# Patient Record
Sex: FEMALE | Race: OTHER | Employment: FULL TIME | URBAN - METROPOLITAN AREA
[De-identification: names, ages, dates, MRNs, and addresses within clinical notes are randomized per-mention and may not be internally consistent; named-entity substitution may affect disease eponyms.]

---

## 2022-04-26 ENCOUNTER — TELEPHONE (OUTPATIENT)
Dept: PRIMARY CARE CLINIC | Age: 47
End: 2022-04-26

## 2022-04-26 NOTE — TELEPHONE ENCOUNTER
----- Message from Bin Dolores sent at 2022 12:21 PM EDT -----  Subject: Appointment Request    Reason for Call: New Patient Request Appointment    QUESTIONS  Type of Appointment? New Patient/New to Provider  Reason for appointment request? No appointments available during search  Additional Information for Provider? please call needs a new PCP and would   like this location   ---------------------------------------------------------------------------  --------------  CALL BACK INFO  What is the best way for the office to contact you? OK to leave message on   voicemail  Preferred Call Back Phone Number? [phone]  ---------------------------------------------------------------------------  --------------  SCRIPT ANSWERS  Relationship to Patient? Self  Specialty Confirmation? Primary Care  Is this the first appointment to establish care for a ? No  Have you been diagnosed with, awaiting test results for, or told that you   are suspected of having COVID-19 (Coronavirus)? (If patient has tested   negative or was tested as a requirement for work, school, or travel and   not based on symptoms, answer no)? No  Within the past 10 days have you developed any of the following symptoms   (answer no if symptoms have been present longer than 10 days or began   more than 10 days ago)? Fever or Chills, Cough, Shortness of breath or   difficulty breathing, Loss of taste or smell, Sore throat, Nasal   congestion, Sneezing or runny nose, Fatigue or generalized body aches   (answer no if pain is specific to a body part e.g. back pain), Diarrhea,   Headache? No  Have you had close contact with someone with COVID-19 in the last 7 days? No  (Service Expert  click yes below to proceed with [employer] As Usual   Scheduling)? Yes  Have you been diagnosed with, awaiting test results for, or told that you   are suspected of having COVID-19 (Coronavirus)?  (If patient has tested   negative or was tested as a requirement for work, school, or travel and   not based on symptoms, answer no)? No  Within the past 10 days have you developed any of the following symptoms   (answer no if symptoms have been present longer than 10 days or began   more than 10 days ago)? Fever or Chills, Cough, Shortness of breath or   difficulty breathing, Loss of taste or smell, Sore throat, Nasal   congestion, Sneezing or runny nose, Fatigue or generalized body aches   (answer no if pain is specific to a body part e.g. back pain), Diarrhea,   Headache? No  Have you had close contact with someone with COVID-19 in the last 7 days? No  (Service Expert  click yes below to proceed with [employer] As Usual   Scheduling)?  Yes

## 2022-10-12 ENCOUNTER — OFFICE VISIT (OUTPATIENT)
Dept: PRIMARY CARE CLINIC | Age: 47
End: 2022-10-12
Payer: COMMERCIAL

## 2022-10-12 VITALS
BODY MASS INDEX: 23.39 KG/M2 | SYSTOLIC BLOOD PRESSURE: 132 MMHG | HEART RATE: 71 BPM | HEIGHT: 70 IN | OXYGEN SATURATION: 98 % | DIASTOLIC BLOOD PRESSURE: 82 MMHG | WEIGHT: 163.4 LBS

## 2022-10-12 DIAGNOSIS — I73.00 RAYNAUD'S DISEASE WITHOUT GANGRENE: ICD-10-CM

## 2022-10-12 DIAGNOSIS — F90.9 ATTENTION DEFICIT HYPERACTIVITY DISORDER (ADHD), UNSPECIFIED ADHD TYPE: ICD-10-CM

## 2022-10-12 DIAGNOSIS — Z12.11 SCREENING FOR COLON CANCER: ICD-10-CM

## 2022-10-12 DIAGNOSIS — Z13.1 SCREENING FOR DIABETES MELLITUS: ICD-10-CM

## 2022-10-12 DIAGNOSIS — Z13.220 SCREENING FOR LIPID DISORDERS: ICD-10-CM

## 2022-10-12 DIAGNOSIS — F41.9 ANXIETY: ICD-10-CM

## 2022-10-12 DIAGNOSIS — Z00.00 WELL ADULT EXAM: Primary | ICD-10-CM

## 2022-10-12 DIAGNOSIS — N95.1 PERIMENOPAUSAL: ICD-10-CM

## 2022-10-12 PROCEDURE — 99386 PREV VISIT NEW AGE 40-64: CPT | Performed by: FAMILY MEDICINE

## 2022-10-12 RX ORDER — BUPROPION HYDROCHLORIDE 300 MG/1
TABLET ORAL
COMMUNITY
Start: 2022-09-24

## 2022-10-12 RX ORDER — DEXMETHYLPHENIDATE HYDROCHLORIDE 10 MG/1
TABLET ORAL
COMMUNITY
Start: 2022-09-28

## 2022-10-12 RX ORDER — DEXMETHYLPHENIDATE HYDROCHLORIDE 40 MG/1
CAPSULE, EXTENDED RELEASE ORAL
COMMUNITY
Start: 2022-09-26

## 2022-10-12 SDOH — ECONOMIC STABILITY: FOOD INSECURITY: WITHIN THE PAST 12 MONTHS, THE FOOD YOU BOUGHT JUST DIDN'T LAST AND YOU DIDN'T HAVE MONEY TO GET MORE.: NEVER TRUE

## 2022-10-12 SDOH — ECONOMIC STABILITY: FOOD INSECURITY: WITHIN THE PAST 12 MONTHS, YOU WORRIED THAT YOUR FOOD WOULD RUN OUT BEFORE YOU GOT MONEY TO BUY MORE.: NEVER TRUE

## 2022-10-12 ASSESSMENT — ENCOUNTER SYMPTOMS
ABDOMINAL PAIN: 0
CHEST TIGHTNESS: 0
BACK PAIN: 0
CONSTIPATION: 0
SHORTNESS OF BREATH: 0
DIARRHEA: 0
COUGH: 0
SORE THROAT: 0

## 2022-10-12 ASSESSMENT — PATIENT HEALTH QUESTIONNAIRE - PHQ9
SUM OF ALL RESPONSES TO PHQ9 QUESTIONS 1 & 2: 0
SUM OF ALL RESPONSES TO PHQ QUESTIONS 1-9: 0
1. LITTLE INTEREST OR PLEASURE IN DOING THINGS: 0
2. FEELING DOWN, DEPRESSED OR HOPELESS: 0
SUM OF ALL RESPONSES TO PHQ QUESTIONS 1-9: 0

## 2022-10-12 ASSESSMENT — SOCIAL DETERMINANTS OF HEALTH (SDOH): HOW HARD IS IT FOR YOU TO PAY FOR THE VERY BASICS LIKE FOOD, HOUSING, MEDICAL CARE, AND HEATING?: NOT HARD AT ALL

## 2022-10-12 NOTE — PROGRESS NOTES
Subjective:     Patient ID: Mitesh Umana is a 56 y.o. female    HPI  Very nice lady is new to the practice is transferring because of insurance reasons. Her past medical history is includes treatment for anxiety and ADHD. She is currently on bupropion as well as dexmethylphenidate under the care of Dr. Lonnie Mancera. She also has a history of Raynaud's phenomenon. ? Menopause? Lmp June 20  was having hotflashes and night sweats   Exercises 3 days weekly  Nutrition good   stable weight  Works as a special . She does drink alcohol but does not smoke  Has 3 children and a . Review of Systems   Constitutional:  Negative for activity change, appetite change, fatigue and fever. HENT:  Negative for sore throat. Eyes:  Negative for visual disturbance. Respiratory:  Negative for cough, chest tightness and shortness of breath. Cardiovascular:  Negative for chest pain, palpitations and leg swelling. Gastrointestinal:  Negative for abdominal pain, constipation and diarrhea. Endocrine: Negative for cold intolerance. Genitourinary:  Negative for dysuria and urgency. Musculoskeletal:  Negative for back pain. Neurological:  Negative for dizziness, syncope and headaches. Hematological:  Does not bruise/bleed easily. Psychiatric/Behavioral:  Positive for decreased concentration. Negative for confusion and sleep disturbance. The patient is nervous/anxious. Objective:     Physical Exam  Vitals and nursing note reviewed. Constitutional:       General: She is not in acute distress. Appearance: Normal appearance. HENT:      Head: Normocephalic. Right Ear: External ear normal.      Left Ear: External ear normal.      Nose: Nose normal.      Mouth/Throat:      Mouth: Mucous membranes are moist.      Pharynx: Oropharynx is clear. Eyes:      Conjunctiva/sclera: Conjunctivae normal.      Pupils: Pupils are equal, round, and reactive to light.    Cardiovascular:      Rate and Rhythm: Normal rate and regular rhythm. Pulses: Normal pulses. Heart sounds: Normal heart sounds. No murmur heard. Pulmonary:      Effort: Pulmonary effort is normal.      Breath sounds: Normal breath sounds. No wheezing. Musculoskeletal:         General: Normal range of motion. Cervical back: Neck supple. Right lower leg: No edema. Left lower leg: No edema. Lymphadenopathy:      Cervical: No cervical adenopathy. Skin:     General: Skin is warm and dry. Capillary Refill: Capillary refill takes less than 2 seconds. Neurological:      General: No focal deficit present. Mental Status: She is alert and oriented to person, place, and time. Psychiatric:         Mood and Affect: Mood normal.         Behavior: Behavior normal.       Assessment/Plan:     1. Well adult exam    2. Perimenopausal    3. Attention deficit hyperactivity disorder (ADHD), unspecified ADHD type    4. Anxiety    5. Raynaud's disease without gangrene    6. Screening for colon cancer    7. Screening for lipid disorders    8. Screening for diabetes mellitus          Anson Barone was seen today for new patient and other. Diagnoses and all orders for this visit:    Well adult exam  We discussed the anti-inflammatory diet and low glycemic index. We also discussed carbohydrate to fiber ratio no higher than 10-1. We discussed no naked carbohydrates. Discussed the headspace meditation jigna. Discussed 8 fit jigna for HIIT. Discussed 150 minutes of moderate exercise weekly  Reinforced need for quality sleep  Specific for the insomnia add magnesium 400 mg 2 hours before bedtime, OLLI an  hour before bedtime, DHA fish oil 1 g in the morning. Perimenopausal  Most certainly she is postmenopausal and has hot flashes associated with that. We discussed checking FSH and LH which she has neither. 2 years. Discussed adding DHA fish oil and magnesium as well as anti-inflammatory lifestyle.   Handouts given to her with brief explanation. Also discussed some supplements that may help her with her symptoms  Attention deficit hyperactivity disorder (ADHD), unspecified ADHD type  Treated with Focalin    Anxiety  Treated with Wellbutrin    Raynaud's disease without gangrene  -     Sedimentation Rate; Future    Screening for colon cancer  -     CBC with Auto Differential; Future  -     Basic Metabolic Panel; Future  -     Lipid Panel; Future  -     Urinalysis; Future  -     Cologuard (Fecal DNA Colorectal Cancer Screening)    Screening for lipid disorders  -     CBC with Auto Differential; Future  -     Basic Metabolic Panel; Future  -     Lipid Panel; Future  -     Urinalysis; Future  -     Cologuard (Fecal DNA Colorectal Cancer Screening)    Screening for diabetes mellitus  -     CBC with Auto Differential; Future  -     Basic Metabolic Panel; Future  -     Lipid Panel; Future  -     Urinalysis; Future  -     Cologuard (Fecal DNA Colorectal Cancer Screening)  We will call her with results of her blood work and follow-up as needed      Venu Diaz MD    Please note that this chart was generated using voice recognition Dragon dictation software. Although every effort was made to ensure the accuracy of this automated transcription, some errors in transcription may have occurred.

## 2022-10-12 NOTE — PATIENT INSTRUCTIONS
Recommend [employer] fish oil or NOW brand of 1000 mg of DHA daily  Recommend magnesium 400 to 600 mg 2 hours before bedtime.   If GI upset with magnesium oxide may use magnesium malate or magnesium threonate

## 2023-03-17 LAB
BASOPHILS ABSOLUTE: 0.1 /ΜL
BASOPHILS RELATIVE PERCENT: 1 %
BILIRUBIN, URINE: NEGATIVE
BLOOD, URINE: NEGATIVE
BUN BLDV-MCNC: 12 MG/DL
CALCIUM SERPL-MCNC: 9.5 MG/DL
CHLORIDE BLD-SCNC: 105 MMOL/L
CHOLESTEROL, TOTAL: 194 MG/DL
CHOLESTEROL/HDL RATIO: 2.3
CLARITY: CLEAR
CO2: 28 MMOL/L
COLOR: YELLOW
CREAT SERPL-MCNC: 0.88 MG/DL
EGFR: 82
EOSINOPHILS ABSOLUTE: 0.1 /ΜL
EOSINOPHILS RELATIVE PERCENT: 1.8 %
GLUCOSE BLD-MCNC: 78 MG/DL
GLUCOSE URINE: NEGATIVE
HCT VFR BLD CALC: 44.5 % (ref 36–46)
HDLC SERPL-MCNC: 83 MG/DL (ref 35–70)
HEMOGLOBIN: 14.9 G/DL (ref 12–16)
KETONES, URINE: NEGATIVE
LDL CHOLESTEROL CALCULATED: 95 MG/DL (ref 0–160)
LEUKOCYTE ESTERASE, URINE: NEGATIVE
LYMPHOCYTES ABSOLUTE: 1.8 /ΜL
LYMPHOCYTES RELATIVE PERCENT: 26.1 %
MCH RBC QN AUTO: 32 PG
MCHC RBC AUTO-ENTMCNC: 33.4 G/DL
MCV RBC AUTO: 96 FL
MONOCYTES ABSOLUTE: 0.4 /ΜL
MONOCYTES RELATIVE PERCENT: 5.9 %
NEUTROPHILS ABSOLUTE: 4.6 /ΜL
NEUTROPHILS RELATIVE PERCENT: 65.2 %
NITRITE, URINE: NEGATIVE
NONHDLC SERPL-MCNC: ABNORMAL MG/DL
PDW BLD-RTO: 12.7 %
PH UA: 7 (ref 4.5–8)
PLATELET # BLD: 247 K/ΜL
PMV BLD AUTO: 9 FL
POTASSIUM SERPL-SCNC: 4.4 MMOL/L
PROTEIN UA: POSITIVE
RBC # BLD: 4.65 10^6/ΜL
SEDIMENTATION RATE, ERYTHROCYTE: 3
SODIUM BLD-SCNC: 141 MMOL/L
SPECIFIC GRAVITY, URINE: 1.02
TRIGL SERPL-MCNC: 79 MG/DL
UROBILINOGEN, URINE: NORMAL
VLDLC SERPL CALC-MCNC: ABNORMAL MG/DL
WBC # BLD: 7 10^3/ML

## 2023-03-20 DIAGNOSIS — N95.1 PERIMENOPAUSAL: ICD-10-CM

## 2023-03-20 DIAGNOSIS — Z13.1 SCREENING FOR DIABETES MELLITUS: ICD-10-CM

## 2023-03-20 DIAGNOSIS — F90.9 ATTENTION DEFICIT HYPERACTIVITY DISORDER (ADHD), UNSPECIFIED ADHD TYPE: ICD-10-CM

## 2023-03-20 DIAGNOSIS — F41.9 ANXIETY: ICD-10-CM

## 2023-03-20 DIAGNOSIS — I73.00 RAYNAUD'S DISEASE WITHOUT GANGRENE: ICD-10-CM

## 2023-03-20 DIAGNOSIS — Z12.11 SCREENING FOR COLON CANCER: ICD-10-CM

## 2023-03-20 DIAGNOSIS — Z13.220 SCREENING FOR LIPID DISORDERS: ICD-10-CM

## 2024-03-12 ENCOUNTER — OFFICE VISIT (OUTPATIENT)
Dept: PRIMARY CARE CLINIC | Age: 49
End: 2024-03-12
Payer: COMMERCIAL

## 2024-03-12 VITALS
SYSTOLIC BLOOD PRESSURE: 114 MMHG | DIASTOLIC BLOOD PRESSURE: 76 MMHG | HEART RATE: 74 BPM | OXYGEN SATURATION: 97 % | HEIGHT: 70 IN | BODY MASS INDEX: 23.65 KG/M2 | WEIGHT: 165.2 LBS

## 2024-03-12 DIAGNOSIS — Z11.59 ENCOUNTER FOR HEPATITIS C SCREENING TEST FOR LOW RISK PATIENT: ICD-10-CM

## 2024-03-12 DIAGNOSIS — Z12.31 ENCOUNTER FOR SCREENING MAMMOGRAM FOR MALIGNANT NEOPLASM OF BREAST: ICD-10-CM

## 2024-03-12 DIAGNOSIS — Z00.00 ENCOUNTER FOR HEALTH MAINTENANCE EXAMINATION IN ADULT: Primary | ICD-10-CM

## 2024-03-12 DIAGNOSIS — Z11.4 ENCOUNTER FOR SCREENING FOR HUMAN IMMUNODEFICIENCY VIRUS (HIV): ICD-10-CM

## 2024-03-12 DIAGNOSIS — Z13.1 ENCOUNTER FOR SCREENING FOR DIABETES MELLITUS: ICD-10-CM

## 2024-03-12 DIAGNOSIS — R23.2 HOT FLASHES: ICD-10-CM

## 2024-03-12 DIAGNOSIS — N95.1 PERIMENOPAUSAL: ICD-10-CM

## 2024-03-12 PROCEDURE — 99396 PREV VISIT EST AGE 40-64: CPT | Performed by: STUDENT IN AN ORGANIZED HEALTH CARE EDUCATION/TRAINING PROGRAM

## 2024-03-12 SDOH — ECONOMIC STABILITY: HOUSING INSECURITY
IN THE LAST 12 MONTHS, WAS THERE A TIME WHEN YOU DID NOT HAVE A STEADY PLACE TO SLEEP OR SLEPT IN A SHELTER (INCLUDING NOW)?: NO

## 2024-03-12 SDOH — ECONOMIC STABILITY: FOOD INSECURITY: WITHIN THE PAST 12 MONTHS, THE FOOD YOU BOUGHT JUST DIDN'T LAST AND YOU DIDN'T HAVE MONEY TO GET MORE.: NEVER TRUE

## 2024-03-12 SDOH — ECONOMIC STABILITY: INCOME INSECURITY: HOW HARD IS IT FOR YOU TO PAY FOR THE VERY BASICS LIKE FOOD, HOUSING, MEDICAL CARE, AND HEATING?: NOT HARD AT ALL

## 2024-03-12 SDOH — ECONOMIC STABILITY: FOOD INSECURITY: WITHIN THE PAST 12 MONTHS, YOU WORRIED THAT YOUR FOOD WOULD RUN OUT BEFORE YOU GOT MONEY TO BUY MORE.: NEVER TRUE

## 2024-03-12 ASSESSMENT — PATIENT HEALTH QUESTIONNAIRE - PHQ9
SUM OF ALL RESPONSES TO PHQ QUESTIONS 1-9: 0
SUM OF ALL RESPONSES TO PHQ9 QUESTIONS 1 & 2: 0
SUM OF ALL RESPONSES TO PHQ QUESTIONS 1-9: 0
2. FEELING DOWN, DEPRESSED OR HOPELESS: NOT AT ALL
1. LITTLE INTEREST OR PLEASURE IN DOING THINGS: NOT AT ALL
SUM OF ALL RESPONSES TO PHQ QUESTIONS 1-9: 0
SUM OF ALL RESPONSES TO PHQ QUESTIONS 1-9: 0

## 2024-03-12 NOTE — PROGRESS NOTES
[facility]      Date of Visit:  3/12/2024  Patient Name: Anisa Caceres   Patient :  1975     CHIEF COMPLAINT:     Anisa Caceres is a 48 y.o. female who presents today to be evaluated for the following condition(s):  Chief Complaint   Patient presents with    Annual Exam     LOV - 10/2022 with Dr. Olivares  Last PAP - due in March  Last CRAIG - due in March  Self-breast exams - yes  Last colonoscopy - pt did cologuard in  - NEG  Dietary - tries to eat healthy  Last labs - 3/17/23  Paperwork - none    Pt is requesting labs and would like to discuss hot flashes - possibly having labs done for hormones.        HISTORY OF PRESENT ILLNESS:      HPI   Patient is presenting today to establish care.    Patient is a former patient of Dr. Olivares who I am seeing for the first time today.    Patient has been experiencing intermittent hot flashes which are bothersome; inquiring regarding need for hormonal testing.  Her last menses was in 2023.  She may be interested in hormonal therapy.  Previously following with BELINDA Terry.    Last PAP smear; unsure of exact date; h/o abnormal pap but unclear as to details; due for repeat.  Last mammogram was 2021; normal  She does perform self breast exams at home.  Last DEXA was never.  Colorectal cancer screening: Negative Cologuard in 2022  Last blood work was 3/2023      Does patient follow any particular diet? Overall, healthy  Does patient exercise on a regular basis? Yes  Does patient use tobacco products? No  How much alcohol do they consume in a week? Variable; not excessive  Do they have concerns with their overall health today? No   Does the patient have any labs they need done for their insurance/job? No  Does the patient have any paperwork that needs filled out for their insurance/job? No    Preventative Testing:  Pneumococcal vaccine: Not indicated for this patient  COVID-19 vaccine: Recommended  Influenza vaccine: Recommended  Hepatitis

## 2024-06-04 ENCOUNTER — OFFICE VISIT (OUTPATIENT)
Age: 49
End: 2024-06-04
Payer: COMMERCIAL

## 2024-06-04 VITALS
WEIGHT: 161.6 LBS | HEART RATE: 78 BPM | BODY MASS INDEX: 24.49 KG/M2 | DIASTOLIC BLOOD PRESSURE: 70 MMHG | HEIGHT: 68 IN | TEMPERATURE: 98.4 F | SYSTOLIC BLOOD PRESSURE: 116 MMHG

## 2024-06-04 DIAGNOSIS — Z01.419 WELL WOMAN EXAM WITH ROUTINE GYNECOLOGICAL EXAM: ICD-10-CM

## 2024-06-04 DIAGNOSIS — R23.2 HOT FLASHES: ICD-10-CM

## 2024-06-04 DIAGNOSIS — N89.8 VAGINAL DRYNESS: ICD-10-CM

## 2024-06-04 DIAGNOSIS — M25.552 PAIN OF LEFT HIP JOINT: ICD-10-CM

## 2024-06-04 DIAGNOSIS — N95.1 MENOPAUSAL SYMPTOMS: Primary | ICD-10-CM

## 2024-06-04 LAB
BILIRUBIN, POC: ABNORMAL
BLOOD URINE, POC: ABNORMAL
CLARITY, POC: CLEAR
COLOR, POC: YELLOW
GLUCOSE URINE, POC: ABNORMAL
KETONES, POC: 15
LEUKOCYTE EST, POC: ABNORMAL
NITRITE, POC: ABNORMAL
PH, POC: 5
PROTEIN, POC: ABNORMAL
SPECIFIC GRAVITY, POC: 1.02
UROBILINOGEN, POC: ABNORMAL

## 2024-06-04 PROCEDURE — 81003 URINALYSIS AUTO W/O SCOPE: CPT

## 2024-06-04 PROCEDURE — 99386 PREV VISIT NEW AGE 40-64: CPT

## 2024-06-04 RX ORDER — ESTRADIOL AND LEVONORGESTREL .045; .015 MG/D; MG/D
1 PATCH TRANSDERMAL WEEKLY
Qty: 4 PATCH | Refills: 1 | Status: SHIPPED | OUTPATIENT
Start: 2024-06-04

## 2024-06-04 NOTE — PROGRESS NOTES
[facility], [facility] UROGYNECOLOGY AND PELVIC REHABILITATION   [address] [place] [address]  Dept: [phone]  Date: 2024  Patient Name: Anisa Caceres    VISIT - NEW PATIENT VISIT     CC: had concerns including New Patient (Patient went into Menopause 2020 and since she is having hot flashes and vaginal dryness. Patient c/o dry skin and joint pain (hip) since 2024. ).      Chaperone present: None Required    HPI: This is a 48 year old,  patient, here to establish care with our office with an annual. She was referred by her PCP. She does report a history of abnormal pap smear, unknown date. LMP was in . Denies all vaginal bleeding or pelvic pain. Denies any itching/burning or other abn discharge; however, she does report vaginal/vulvar dryness. She is sexually active, with on male partner - her . No concerns for STIs, no PCB. She has noted some vaginal dryness that is becoming more prominent, especially during intercourse. She does use lubrication.  Patient does not have any breast concerns, last mammogram was in the last 2 years. There was previously a cyst noted on mammogram. She is getting hot flashes a few times per day; especially when at work. She works as a teacher, high school. Mood is OK, sleeping OK, feels safe at home. She is not on any hormones or other medications to treat hot flashes; although she is interested in starting.   No urinary concerns, no bowel concerns. She is phsycially active, but has noticed that she may be gaining weight. Diet is balanced     Topics of Prolapse, Pain, Pressure were reviewed including type, period of onset, level of severity, quality and quantity, associations, trends, exacerbators, alleviators, bleeding, prolapse to reduce, splinting, and prior treatment / surgery.    Topics of Urinary Leakage were reviewed including type, period of onset, level of severity, quality

## 2024-06-04 NOTE — PATIENT INSTRUCTIONS
if the patient wishes to commence HRT.    PATIENT EDUCATION  Hormone replacement therapy can be either estrogen alone (called estrogen replacement therapy, or ERT), or estrogen and progesterone combined. This combination is referred to as hormone replacement therapy (HRT). Progesterone is usually given in the form of progestins, which are synthetic forms of the naturally occurring hormone progesterone. While once widely used, HRT now has a more limited role because of concerns about its safety.   Medications and Their Commonly Used Names   Estrogen is most commonly given in these forms:   Pill or tablet   Vaginal cream   Vaginal ring insert   Patch   Skin gel   Progestin is available in these forms:   Pill (can be combined with estrogen)   Intrauterine device (IUD)   Vaginal capsule   Injection   Implant   Skin gel     What This Medication Is Prescribed For   To Ease Menopausal Symptoms   Hot flashes   Sleep disturbance   Vaginal dryness     To Reduce the Risk of Osteoporosis and Related Fractures   Estrogen is important for bone health. When the natural supply of estrogen drops off with menopause, HRT can help protect bones by replacing estrogen.     How This Medication Works   The hormones provided with HRT are meant to replace the natural hormones that a woman's body no longer produces after menopause. Estrogen is involved in many functions in the body, and therefore, HRT is believed to provide the following benefits:   Reduce the symptoms of menopause   Helps to slow or prevent the bone loss that occurs with aging and increases after menopause, in order to help delay osteoporosis   Helps to reduce the risk of colorectal cancer     Precautions While Using This Medication   Long-term use of HRT (estrogen plus progestin) may significantly increase women's risks of breast cancer , strokes , heart attacks , and blood clots. ERT may also increase the risk of ovarian cancer . HRT has been associated with an increased

## 2024-06-06 DIAGNOSIS — N95.1 MENOPAUSAL SYMPTOMS: ICD-10-CM

## 2024-06-06 DIAGNOSIS — Z01.419 WELL WOMAN EXAM WITH ROUTINE GYNECOLOGICAL EXAM: ICD-10-CM

## 2024-06-06 PROCEDURE — 77080 DXA BONE DENSITY AXIAL: CPT

## 2024-06-07 NOTE — RESULT ENCOUNTER NOTE
Patient's bone density is showing Osteoporosis of hip. Recommend patient to schedule appointment with NP, or with PCP, to discuss treatment options to lessen risk of fracture.

## 2024-06-15 ASSESSMENT — ENCOUNTER SYMPTOMS
SHORTNESS OF BREATH: 0
VOMITING: 0
ABDOMINAL PAIN: 0
WHEEZING: 0
NAUSEA: 0
BLOOD IN STOOL: 0
DIARRHEA: 0
CONSTIPATION: 0
COUGH: 0

## 2024-07-23 NOTE — PROGRESS NOTES
osteoporosis type  -     Vitamin D 25 Hydroxy; Future  -     PTH, Intact; Future  -     Iron and TIBC  Menopausal symptoms  -     estrogen, conjugated,-medroxyPROGESTERone (PREMPRO) 0.3-1.5 MG per tablet; Take 1 tablet by mouth daily, Disp-28 tablet, R-3Normal  -     Vitamin D 25 Hydroxy; Future  -     PTH, Intact; Future  -     Iron and TIBC     The patient was counseled regarding review of all conditions discussed.     Orders Placed This Encounter   Medications    estrogen, conjugated,-medroxyPROGESTERone (PREMPRO) 0.3-1.5 MG per tablet     Sig: Take 1 tablet by mouth daily     Dispense:  28 tablet     Refill:  3      Switching from patch, or oral HRT. Discussed risks. This is for menopausal symptoms but also for newly diagnoses osteoporosis. She is following ortho for investigation of hip pain.  Will have her follow up in 2 months for HRT. Recommend for her to take 2000iu of vitamin D+K, get calcium in diet, stay active with weight bearing exercises.     2.   Educational handouts were discussed & given when applicable.     3.   Testing recommendations were given as indicated.     4.   Detailed questionnaires regarding the patient's specific condition were given to the patient when indicated. The patient understands that these are her responsibility to complete and return on a voluntary basis. Reminders to complete the questionnaires will not be given. The patient understands that failure to return the questionnaires may not give the provider a full picture of the patient's urogynecologic issues and make treatment less than optimal despite the practitioner's best effort to obtain information.     Multiple records reviewed. All questions were addressed to the patient's satisfaction.  Additional time (minutes) spent regarding today's visit:  20 Performing: ACVISITTIMEPERFORM: Pre-visit chart review and note construction, Extensive counseling, and Covering additional health topics on top of those listed in the chief

## 2024-07-24 ENCOUNTER — INITIAL CONSULT (OUTPATIENT)
Age: 49
End: 2024-07-24
Payer: COMMERCIAL

## 2024-07-24 ENCOUNTER — HOSPITAL ENCOUNTER (OUTPATIENT)
Age: 49
Discharge: HOME | End: 2024-07-24
Payer: COMMERCIAL

## 2024-07-24 VITALS — SYSTOLIC BLOOD PRESSURE: 104 MMHG | DIASTOLIC BLOOD PRESSURE: 70 MMHG

## 2024-07-24 DIAGNOSIS — N95.1 MENOPAUSAL SYMPTOMS: ICD-10-CM

## 2024-07-24 DIAGNOSIS — M81.0 OSTEOPOROSIS WITHOUT CURRENT PATHOLOGICAL FRACTURE, UNSPECIFIED OSTEOPOROSIS TYPE: Primary | ICD-10-CM

## 2024-07-24 DIAGNOSIS — M81.0 OSTEOPOROSIS WITHOUT CURRENT PATHOLOGICAL FRACTURE, UNSPECIFIED OSTEOPOROSIS TYPE: ICD-10-CM

## 2024-07-24 LAB
25(OH)D3 SERPL-MCNC: 62.1 NG/ML (ref 30–100)
IRON SATN MFR SERPL: 29 % (ref 20–55)
IRON SERPL-MCNC: 73 UG/DL (ref 37–145)
PTH-INTACT SERPL-MCNC: 32 PG/ML (ref 15–65)
TIBC SERPL-MCNC: 254 UG/DL (ref 250–450)
UNSATURATED IRON BINDING CAPACITY: 181 UG/DL (ref 112–347)

## 2024-07-24 PROCEDURE — 83550 IRON BINDING TEST: CPT

## 2024-07-24 PROCEDURE — 36415 COLL VENOUS BLD VENIPUNCTURE: CPT

## 2024-07-24 PROCEDURE — 83970 ASSAY OF PARATHORMONE: CPT

## 2024-07-24 PROCEDURE — 99213 OFFICE O/P EST LOW 20 MIN: CPT

## 2024-07-24 PROCEDURE — 83540 ASSAY OF IRON: CPT

## 2024-07-24 PROCEDURE — 82306 VITAMIN D 25 HYDROXY: CPT

## 2024-07-24 RX ORDER — ESTROGEN,CON/M-PROGEST ACET 0.3-1.5MG
1 TABLET ORAL DAILY
Qty: 28 TABLET | Refills: 3 | Status: SHIPPED | OUTPATIENT
Start: 2024-07-24

## 2024-07-24 NOTE — PATIENT INSTRUCTIONS
Vitamin D+K - [employer]  Osteoporosis: After Your Visit     Your Care Instructions   Osteoporosis causes bones to become thin and weak. It is much more common in women than in men. Osteoporosis may be very advanced before you know you have it. Sometimes the first sign is a broken bone in the hip, spine, or wrist or sudden pain in your middle or lower back.     Follow-up care is a key part of your treatment and safety. Be sure to make and go to all appointments, and call your doctor if you are having problems. It’s also a good idea to know your test results and keep a list of the medicines you take.     How can you care for yourself at home?   Your doctor may prescribe a bisphosphonate, such as oral risedronate (Actonel) or injectable denosumab (Prolia), for osteoporosis prevention and treatment.     If you are taking an oral medicine :   Take your oral medicine with a full glass of water when you first get up in the morning.   Do not lie down, eat, drink a beverage, or take any other medicine for at least 30 minutes after taking the drug. This helps prevent stomach problems.   Do not take your medicine late in the day if you forgot to take it in the morning. Skip it, and take the usual dose the next morning.   If you have side effects, tell your doctor. He or she may prescribe another medicine.     If you are taking an injectable or IV medicine:  You may need pre-treatment labs to assess calcium, vitamin D, and kidney function    Get enough calcium and vitamin D. The [employer] recommends adults younger than age 51 need 1,000 mg of calcium and 600 IU of vitamin D each day. Women ages 51 to 70 need 1,200 mg of calcium and 600 IU of vitamin D each day. Men ages 51 to 70 need 1,000 mg of calcium and 600 IU of vitamin D each day. Adults 71 and older need 1,200 mg of calcium and 800 IU of vitamin D each day..   Eat foods rich in calcium, like yogurt, cheese, milk, and dark green vegetables. This is a good way to

## 2024-08-13 DIAGNOSIS — N94.89 ADNEXAL MASS: Primary | ICD-10-CM

## 2024-08-13 NOTE — PROGRESS NOTES
Pelvic US order placed as follow up for abnormality seen on MRI - patient message. Please call patient to schedule pelvic US.

## 2024-08-13 NOTE — PROGRESS NOTES
Left message for patient to call if wants USN here, or she can go to [facility]

## 2024-08-21 DIAGNOSIS — N94.89 ADNEXAL MASS: ICD-10-CM

## 2024-08-21 PROCEDURE — 76830 TRANSVAGINAL US NON-OB: CPT

## 2024-08-21 PROCEDURE — 76856 US EXAM PELVIC COMPLETE: CPT

## 2024-08-21 NOTE — RESULT ENCOUNTER NOTE
Pelvic US reviewed. US showing a right hydrosalpinx, which is when the fallopian tube fills with fluid. I would recommend patient to return for STI testing (STI's may be a cause of this; however, there are other causes as well) and discussion of next steps. Please call patient and let her know / schedule follow up.

## 2024-09-05 NOTE — PROGRESS NOTES
type, period of onset, level of severity, quality and quantity, associations, trends, exacerbators, alleviators, times per day/week, stool quality / quantity, rectal bleeding, hemorrhoids, constipation / Anismus / Proctalgia fugax, laxative abuse, and prior treatment / surgery.    Topics of General Gynecology were revisited including gravity/parity, #'s, perineal delivery trauma, LMP, days of flow, heaviest days, # pads / tampons per day, cramps, anemia, discharge, prior STI's.    Flowsheet:  Screenings reviewed per Flowsheet including Pap smear, mammogram, dexascan, colonoscopy. Any screenings due will be ordered for this visit if patient agrees.      ROS:  Review of Systems   Genitourinary:  Positive for pelvic pain.       PHYSICAL EXAM:  BP (!) 138/95 (Site: Right Upper Arm, Position: Sitting, Cuff Size: Medium Adult)   Pulse 67   Temp 97.8 °F (36.6 °C) (Temporal)   SpO2 98%     Physical Exam  Constitutional:       Appearance: Normal appearance. She is normal weight.   Genitourinary:      Bladder and urethral meatus normal.      No lesions in the vagina.      Right Labia: No rash, tenderness, lesions or skin changes.     Left Labia: No tenderness, lesions, skin changes or rash.     Vaginal cuff intact.     No vaginal tenderness.      No vaginal prolapse present.     No vaginal atrophy present.       Right Adnexa: not tender and no mass present.     Left Adnexa: not tender and no mass present.     No cervical lesion.      Uterus is not enlarged or tender.      No uterine mass detected.     No urethral tenderness or mass present.      Pelvic Floor comments: Pelvic cavity: Transvaginal digital palpation and Kegel's squeeze palpable.  Myalgia and myositis of the pelvic floor not present to contra-indicate a procedure.  No significant complications or exposures of any prior placed mesh.  No significant pelvic organ prolapse contraindicating a procedure..  HENT:      Head: Normocephalic and atraumatic.      Right

## 2024-09-06 ENCOUNTER — OFFICE VISIT (OUTPATIENT)
Age: 49
End: 2024-09-06
Payer: COMMERCIAL

## 2024-09-06 VITALS
SYSTOLIC BLOOD PRESSURE: 138 MMHG | DIASTOLIC BLOOD PRESSURE: 95 MMHG | OXYGEN SATURATION: 98 % | TEMPERATURE: 97.8 F | HEART RATE: 67 BPM

## 2024-09-06 DIAGNOSIS — N95.1 MENOPAUSAL SYMPTOMS: Primary | ICD-10-CM

## 2024-09-06 DIAGNOSIS — A64 STI (SEXUALLY TRANSMITTED INFECTION): ICD-10-CM

## 2024-09-06 DIAGNOSIS — M81.0 OSTEOPOROSIS WITHOUT CURRENT PATHOLOGICAL FRACTURE, UNSPECIFIED OSTEOPOROSIS TYPE: ICD-10-CM

## 2024-09-06 DIAGNOSIS — N70.11 HYDROSALPINX: ICD-10-CM

## 2024-09-06 PROCEDURE — 99213 OFFICE O/P EST LOW 20 MIN: CPT

## 2024-09-27 ENCOUNTER — TELEPHONE (OUTPATIENT)
Dept: PRIMARY CARE CLINIC | Age: 49
End: 2024-09-27

## 2024-10-07 NOTE — PROGRESS NOTES
[facility] UROGYNECOLOGY AND PELVIC REHABILITATION   [address] [place] [address]  Dept: [phone]  Date: 10/7/2024  Patient Name: Anisa Caceres    VISIT - FOLLOW UP VISIT     CC: had no chief complaint listed for this encounter.    Chaperone present: Resident    HPI: Recent torn labrum with MRI showed RIGHT hydrosalpinx which pelvic US confirmed. GCC negative. HIV negative. No discharge. No pain.        Patient is here for follow up. Patient had a pelvic US in August showing a right hydrosalpinx. Recommended to have STI testing. She was recently put on Prempro for HRT. She was initially started on the HRT patch; however, this did not stay on and she was interested in trying oral formulation. She is open to STI testing, although no concerns. She denies any recent abdominal surgeries; however, she does have a history of endometriosis.  She has a history of tubal ligation. She does report a slight ache on her right side. She feels that the Prempro is helpful.     Overall state of well-being, dietary and nutritional habits, exercise routines of at least 30 minutes 3 times a week, bowel and bladder function, smoking history, HRT history, sexual activity and partner/s, dyspareunia, and immunizations all revisited if necessary by chart review or direct questioning.    Topics of Prolapse, Pain, Pressure were revisited including type, period of onset, level of severity, quality and quantity, associations, trends, exacerbators, alleviators, bleeding, prolapse to reduce, splinting, and prior treatment / surgery.    Topics of Urinary Leakage were revisited including type, period of onset, level of severity, quality and quantity, associations, trends, exacerbators, alleviators, urgency, frequency, nocturia ,urge incontinence / stress incontinence triggers, hesitancy, obstruction with need to catheterize, frequent UTI\"s >3 in a year diagnosed

## 2024-10-10 ENCOUNTER — INITIAL CONSULT (OUTPATIENT)
Age: 49
End: 2024-10-10
Payer: COMMERCIAL

## 2024-10-10 VITALS — DIASTOLIC BLOOD PRESSURE: 80 MMHG | SYSTOLIC BLOOD PRESSURE: 118 MMHG

## 2024-10-10 DIAGNOSIS — N70.11 HYDROSALPINX: Primary | ICD-10-CM

## 2024-10-10 PROCEDURE — 99213 OFFICE O/P EST LOW 20 MIN: CPT | Performed by: OBSTETRICS & GYNECOLOGY

## 2024-10-10 RX ORDER — MAGNESIUM 30 MG
30 TABLET ORAL 2 TIMES DAILY
COMMUNITY

## 2024-10-10 RX ORDER — ASCORBIC ACID 500 MG
500 TABLET ORAL DAILY
COMMUNITY

## 2024-11-18 DIAGNOSIS — N95.1 MENOPAUSAL SYMPTOMS: ICD-10-CM

## 2024-11-18 RX ORDER — ESTROGEN,CON/M-PROGEST ACET 0.3-1.5MG
1 TABLET ORAL DAILY
Qty: 28 TABLET | Refills: 3 | Status: SHIPPED | OUTPATIENT
Start: 2024-11-18

## 2024-12-05 ENCOUNTER — OFFICE VISIT (OUTPATIENT)
Dept: PRIMARY CARE CLINIC | Age: 49
End: 2024-12-05

## 2024-12-05 VITALS
SYSTOLIC BLOOD PRESSURE: 118 MMHG | DIASTOLIC BLOOD PRESSURE: 82 MMHG | RESPIRATION RATE: 14 BRPM | HEIGHT: 69 IN | WEIGHT: 160.2 LBS | OXYGEN SATURATION: 99 % | HEART RATE: 72 BPM | BODY MASS INDEX: 23.73 KG/M2

## 2024-12-05 DIAGNOSIS — S73.191S TEAR OF RIGHT ACETABULAR LABRUM, SEQUELA: Primary | ICD-10-CM

## 2024-12-05 DIAGNOSIS — Z13.0 SCREENING, ANEMIA, DEFICIENCY, IRON: ICD-10-CM

## 2024-12-05 DIAGNOSIS — Z13.6 SCREENING FOR CARDIOVASCULAR CONDITION: ICD-10-CM

## 2024-12-05 DIAGNOSIS — N95.1 PERIMENOPAUSAL: ICD-10-CM

## 2024-12-05 DIAGNOSIS — F41.9 ANXIETY: ICD-10-CM

## 2024-12-05 DIAGNOSIS — F90.2 ATTENTION DEFICIT HYPERACTIVITY DISORDER (ADHD), COMBINED TYPE: ICD-10-CM

## 2024-12-05 DIAGNOSIS — N70.11 HYDROSALPINX: ICD-10-CM

## 2024-12-05 DIAGNOSIS — Z13.1 SCREENING FOR DIABETES MELLITUS (DM): ICD-10-CM

## 2024-12-05 ASSESSMENT — ENCOUNTER SYMPTOMS
VOMITING: 0
CHEST TIGHTNESS: 0
ABDOMINAL PAIN: 0
SHORTNESS OF BREATH: 0
SORE THROAT: 0
RHINORRHEA: 0
DIARRHEA: 0
NAUSEA: 0
COLOR CHANGE: 0

## 2024-12-05 ASSESSMENT — PATIENT HEALTH QUESTIONNAIRE - PHQ9
SUM OF ALL RESPONSES TO PHQ QUESTIONS 1-9: 0
2. FEELING DOWN, DEPRESSED OR HOPELESS: NOT AT ALL
1. LITTLE INTEREST OR PLEASURE IN DOING THINGS: NOT AT ALL
SUM OF ALL RESPONSES TO PHQ9 QUESTIONS 1 & 2: 0
SUM OF ALL RESPONSES TO PHQ QUESTIONS 1-9: 0

## 2024-12-05 NOTE — PROGRESS NOTES
applicable) and other individuals in attendance at the appointment consented to the use of AI, including the recording.

## 2025-03-17 DIAGNOSIS — N95.1 MENOPAUSAL SYMPTOMS: ICD-10-CM

## 2025-03-17 RX ORDER — ESTROGEN,CON/M-PROGEST ACET 0.3-1.5MG
1 TABLET ORAL DAILY
Qty: 28 TABLET | Refills: 3 | Status: SHIPPED | OUTPATIENT
Start: 2025-03-17

## 2025-08-14 DIAGNOSIS — N95.1 MENOPAUSAL SYMPTOMS: ICD-10-CM

## 2025-08-15 RX ORDER — ESTROGEN,CON/M-PROGEST ACET 0.3-1.5MG
1 TABLET ORAL DAILY
Qty: 28 TABLET | Refills: 3 | Status: SHIPPED | OUTPATIENT
Start: 2025-08-15

## 2025-08-25 ENCOUNTER — TELEPHONE (OUTPATIENT)
Dept: PRIMARY CARE CLINIC | Age: 50
End: 2025-08-25

## 2025-08-25 DIAGNOSIS — Z12.31 SCREENING MAMMOGRAM FOR BREAST CANCER: Primary | ICD-10-CM

## 2025-08-28 DIAGNOSIS — Z12.31 SCREENING MAMMOGRAM FOR BREAST CANCER: ICD-10-CM

## 2025-09-02 ENCOUNTER — TELEPHONE (OUTPATIENT)
Dept: PRIMARY CARE CLINIC | Age: 50
End: 2025-09-02

## 2025-09-02 DIAGNOSIS — R92.8 ABNORMAL MAMMOGRAM OF RIGHT BREAST: Primary | ICD-10-CM
